# Patient Record
Sex: FEMALE | Race: WHITE
[De-identification: names, ages, dates, MRNs, and addresses within clinical notes are randomized per-mention and may not be internally consistent; named-entity substitution may affect disease eponyms.]

---

## 2017-05-03 ENCOUNTER — HOSPITAL ENCOUNTER (OUTPATIENT)
Dept: HOSPITAL 41 - JD.ED | Age: 52
Setting detail: OBSERVATION
LOS: 1 days | Discharge: HOME | End: 2017-05-04
Payer: COMMERCIAL

## 2017-05-03 DIAGNOSIS — Z90.710: ICD-10-CM

## 2017-05-03 DIAGNOSIS — E87.6: ICD-10-CM

## 2017-05-03 DIAGNOSIS — Z80.0: ICD-10-CM

## 2017-05-03 DIAGNOSIS — Z90.89: ICD-10-CM

## 2017-05-03 DIAGNOSIS — F17.210: ICD-10-CM

## 2017-05-03 DIAGNOSIS — Z90.49: ICD-10-CM

## 2017-05-03 DIAGNOSIS — R22.9: ICD-10-CM

## 2017-05-03 DIAGNOSIS — Z98.51: ICD-10-CM

## 2017-05-03 DIAGNOSIS — Z98.890: ICD-10-CM

## 2017-05-03 DIAGNOSIS — R11.0: ICD-10-CM

## 2017-05-03 DIAGNOSIS — R10.31: Primary | ICD-10-CM

## 2017-05-03 DIAGNOSIS — R59.0: ICD-10-CM

## 2017-05-03 DIAGNOSIS — N20.0: ICD-10-CM

## 2017-05-03 DIAGNOSIS — R19.7: ICD-10-CM

## 2017-05-03 PROCEDURE — 96375 TX/PRO/DX INJ NEW DRUG ADDON: CPT

## 2017-05-03 PROCEDURE — 74177 CT ABD & PELVIS W/CONTRAST: CPT

## 2017-05-03 PROCEDURE — 36415 COLL VENOUS BLD VENIPUNCTURE: CPT

## 2017-05-03 PROCEDURE — 80048 BASIC METABOLIC PNL TOTAL CA: CPT

## 2017-05-03 PROCEDURE — 85025 COMPLETE CBC W/AUTO DIFF WBC: CPT

## 2017-05-03 PROCEDURE — 99285 EMERGENCY DEPT VISIT HI MDM: CPT

## 2017-05-03 PROCEDURE — G0378 HOSPITAL OBSERVATION PER HR: HCPCS

## 2017-05-03 PROCEDURE — 74176 CT ABD & PELVIS W/O CONTRAST: CPT

## 2017-05-03 PROCEDURE — 81001 URINALYSIS AUTO W/SCOPE: CPT

## 2017-05-03 PROCEDURE — 96365 THER/PROPH/DIAG IV INF INIT: CPT

## 2017-05-03 PROCEDURE — 86140 C-REACTIVE PROTEIN: CPT

## 2017-05-03 PROCEDURE — 96376 TX/PRO/DX INJ SAME DRUG ADON: CPT

## 2017-05-03 PROCEDURE — 96361 HYDRATE IV INFUSION ADD-ON: CPT

## 2017-05-03 PROCEDURE — 80053 COMPREHEN METABOLIC PANEL: CPT

## 2017-05-03 PROCEDURE — 83735 ASSAY OF MAGNESIUM: CPT

## 2017-05-03 RX ADMIN — HYDROCODONE BITARTRATE AND ACETAMINOPHEN PRN TAB: 5; 325 TABLET ORAL at 15:58

## 2017-05-03 RX ADMIN — HYDROCODONE BITARTRATE AND ACETAMINOPHEN PRN TAB: 5; 325 TABLET ORAL at 20:04

## 2017-05-03 NOTE — PCM.SN
- Free Text/Narrative


Note: 


CT scan with contrast shows Mesenteric Mass, Several small abnormal surrounding 

lymph nodes, Slight enlarged retro-peritoneal lymph node. Adjacent areas of 

bowel wall thickening within the small bowel. Lymphoma vs Carcinoid Tumor with 

Metastasis. Will defer to Dr. Gillette to this this result with patient.

## 2017-05-03 NOTE — PCM.HP
H&P History of Present Illness





- General


Date of Service: 17


Admit Problem/Dx: 


 Admission Diagnosis/Problem





Admission Diagnosis/Problem      Abdominal pain








Source of Information: Patient, Family, Provider, RN notes reviewed


History Limitations: Reports: No limitations





- History of Present Illness


Initial Comments - Free Text/Narative: 


This a 50 yo white female with no significant past medical hx/o who comes in 

with complaints gradually worsening right lower abdominal pain with radiation 

to the back that started yesterday. Her symptom is associated with nausea w/o 

emesis. She also reports watery diarrhea. She denies any fever or chills. She 

denies any urianry symptoms. She denies any hx/o malignancy. She hx/o endoscopy 

in the past.





However she admits to a strong family hx/o colon cancer with her father and a 

sister. Her initial work up in ED shows a CBC remarkable for WBC of 10.93, and 

Neutrophils or 72.6%. Her chemistry is significant for K of 3.1, CO2 of 20, AG 

of 18.1, BS of 149, and AST of 11. Her UA is negative for UTI. Her Abdominal CT 

scan shows an abnormal mesenteric mass with retro-peritoneal adenopathy. 





Patient is being admitted for Abdominal Pain. Dr. Gillette was consulted in ED 

for further evaluation.





  ** Right Lower Abdomen


Pain Score (Numeric/FACES): 10





- Related Data


Allergies/Adverse Reactions: 


 Allergies











Allergy/AdvReac Type Severity Reaction Status Date / Time


 


No Known Allergies Allergy   Verified 17 03:50











Home Medications: 


 Home Meds





Nicotine [Nicotine Patch] 1 patch TRDERM DAILY 16 [History]


Cyclobenzaprine [Flexeril] 5 mg PO DAILY PRN 17 [History]











Past Medical History


OB/GYN History: Reports: Pregnancy





- Past Surgical History


HEENT Surgical History: Reports: Tonsillectomy


GI Surgical History: Reports: Appendectomy


Female  Surgical History: Reports: Breast biopsy,  section, 

Hysterectomy, Tubal ligation


Dermatological Surgical History: Reports: Other (see below)





Social & Family History





- Tobacco Use


Smoking Status *Q: Current Some Day Smoker


Years of Tobacco use: 30


Packs/Tins Daily: 0.5


Used Tobacco, but Quit: Yes


Month Tobacco Last Used: 


Second Hand Smoke Exposure: No





- Caffeine Use


Caffeine Use: Reports: None





- Recreational Drug Use


Recreational Drug Use: No


Drug Use in Last 12 Months: No





H&P Review of Systems





- Review of Systems:


Review Of Systems: See Below


General: Denies: fever, chills


HEENT: Reports: no symptoms


Pulmonary: Denies: Shortness of Breath


Cardiovascular: Denies: chest pain


Gastrointestinal: Reports: Abdominal pain, Diarrhea, Nausea.  Denies: Decreased 

appetite, Difficulty swallowing, Hematemesis, Hematochezia, Vomiting


Genitourinary: Reports: no symptoms


Musculoskeletal: Reports: no symptoms


Skin: Denies: cyanosis, pruritis, rash, erythema


Psychiatric: Denies: depression, anxiety, agitation, hallucinations


Neurological: Denies: Seizure, Difficulty Walking, Weakness, Gait Disturbance


Hematologic/Lymphatic: Reports: no symptoms


Immunologic: Reports: no symptoms





Exam





- Exam


Exam: See Below





- Vital Signs


Vital Signs: 


 Last Vital Signs











Temp  36.2 C   17 03:45


 


Pulse  109 H  17 03:45


 


Resp  20   17 03:45


 


BP  156/79 H  17 03:45


 


Pulse Ox  100   17 03:45











Weight: 98.43 kg





- Exam


General: alert, oriented, cooperative, mild distress


HEENT: Conjunctiva clear, EACs clear, EOMI, Hearing intact, Mucosa moist & pink

, Nares patent, Normal nasal septum, Posterior pharynx clear, Pupils equal, 

Pupils reactive


Neck: supple, trachea midline


Lungs: Clear to auscultation, Normal respiratory effort


Cardiovascular: regular rate, regular rhythm


Abdomen: normal bowel sounds, soft, organomegaly, tenderness (right lower 

abdomen).  No: distention, guarding, rigidity, rebound


 (Female) Exam: Deferred


Rectal (Female) Exam: Deferred


Back Exam: normal inspection, decreased range of motion


Extremities: normal inspection, normal pulses


Peripheral Pulses: 2+: posterior tibial (L), posterior tibial (R), dorsalis 

pedis (L), dorsalis pedis (R)


Skin: warm, dry, intact


Neuro Extensive - Mental Status: oriented x3, normal cognition, memory intact


Neuro Extensive - Motor, Sensory, Reflexes: CN II-XII intact, normal gait


Psychiatric: alert, normal affect, normal mood





- Patient Data


Result Diagrams: 


 17 04:40





 17 04:40





*Q Meaningful Use (ADM)





- VTE *Q


VTE Criteria *Q: 








- Stroke *Q


Stroke Criteria *Q: 








- AMI *Q


AMI Criteria *Q: 





Problem List Initiated/Reviewed/Updated: Yes


Orders Last 24hrs: 


 Active Orders 24 hr











 Category Date Time Status


 


 Sodium Chloride 0.9% [Normal Saline] 1,000 ml Med  17 07:45 Active





 IV ASDIRECTED   








 Medication Orders





Sodium Chloride (Normal Saline)  1,000 mls @ 150 mls/hr IV ASDIRECTED [facility]ATH[facility]


   Last Admin: 17 04:35  Dose: 150 mls/hr


Potassium Chloride 10 meq/ (Premix)  100 mls @ 50 mls/hr IV ASDIRECTED ONE


   Stop: 17 09:27


   Last Admin: 17 07:37  Dose: 50 mls/hr


Sodium Chloride (Normal Saline)  1,000 mls @ 150 mls/hr IV ASDIRECTED KATHERINE


Sodium Chloride (Saline Flush)  10 ml FLUSH ASDIRECTED PRN


   PRN Reason: Keep Vein Open


   Last Admin: 17 04:37  Dose: 10 ml








Assessment/Plan Comment:: 


Assessment/Plan:


Acute:


Right Lower Abdominal Pain


   - Abdominal CT scan: 3 cm small mesenteric soft tissue mass. Retro-

peritoneal adenopathy


   - Strong family hx/o Colon CA : Dad and Sister (both )


   - Suspect Lymphoma/Intra-abdominal Tumor/Carcinoid Tumor


   - Awaiting further input from Dr. Gillette


   - Consider CT scan with Contrast


   - PRN pain meds for now 





Nausea w/o Vomiting


   - PRN Anti-emesis


   - Supportive Care





Watery Diarrhea


   - No unusual drink or diet


   - No recent travel


   - No recent antibiotic use


   - Cannot r/o stomach flu


   - Supportive Clear





Hypokalemia


   - K 3.1 likely from GI Loss


   - Pharmacy to replete and monitor





Non-obstructing Stone in the Right Kidney





Chronic:


Nicotine Dependence/Active Smoker


Muscle Spasm





Plan:


Admit to Obs


Routine AM Labs


Offered Nicotine Patch-refused


PRN Meds for Symptomatic Control


Consult Dr. Gillette


NPO for now until GS eval


SW/CM for d/c planning


CT scan with contrast


Code status:1

## 2017-05-04 VITALS — DIASTOLIC BLOOD PRESSURE: 54 MMHG | SYSTOLIC BLOOD PRESSURE: 132 MMHG

## 2017-05-04 RX ADMIN — HYDROCODONE BITARTRATE AND ACETAMINOPHEN PRN TAB: 5; 325 TABLET ORAL at 12:15

## 2017-05-04 RX ADMIN — HYDROCODONE BITARTRATE AND ACETAMINOPHEN PRN TAB: 5; 325 TABLET ORAL at 00:30

## 2017-05-04 RX ADMIN — HYDROCODONE BITARTRATE AND ACETAMINOPHEN PRN TAB: 5; 325 TABLET ORAL at 06:20

## 2017-05-04 NOTE — CONS
CONSULTING PHYSICIAN:  Bronson Gillette MD

DATE OF CONSULTATION:  05/03/2017

 

HISTORY:

This is a 51-year-old who has abdominal pain, right lower quadrant, radiating to

the right flank.  This started yesterday morning and continued to be quite

severe.  This was associated with nausea and no vomiting.  She came into the

emergency room and was given Dilaudid for pain.  CT scan was done and a stone

pathology considering that she might have renal stones.  This demonstrated a

small mesenteric soft tissue measuring 3 cm, surrounding mesenteric lymph nodes

seen as well as minimal retroperitoneal adenopathy.  Oral contrast was not

given.  The patient states that, about a week ago, she had similar nagging pain

there.  She was seen in the walk-in clinic at [place], but nothing specific was

established.  The patient now is comfortable and not having any particular

problem.

 

PAST MEDICAL HISTORY:

The patient's past medical history has been that of good health.  She has had a

tonsillectomy and reports in the history of appendectomy, breasts biopsy, C-

section, hysterectomy, and tubal ligation.

 

SOCIAL HISTORY:

She is an everyday smoker.  Does not use alcohol.

 

FAMILY HISTORY:

Prostate cancer in her family.

 

ALLERGIES:

None known.

 

MEDICATIONS:

None.

 

REVIEW OF SYSTEMS:

Has some nausea.  No vomiting.  Does have diarrhea alternating with

constipation, and no change.  No chest pain, shortness of breath, cough,

hoarseness, wheezing, fainting, weakness, numbness, convulsions, swelling, or

swelling of the legs.

 

PHYSICAL EXAMINATION:

GENERAL:  Reveals a cooperative female.

EYES:  Sclerae white.  Extraocular muscle motion normal.

ORAL CAVITY:  Healthy mucous membrane with mouth and tongue.

NECK:  Supple.  No nodes.  No thyromegaly.  Trachea midline.

LUNGS:  Clear.  No rales, rhonchi, or dullness.

HEART:  Tones regular rate.  No S3, S4, jugular venous distention or murmurs.

ABDOMEN:  Soft.  No tenderness, guarding, rebound, organomegaly, or pulsatile

masses.

EXTREMITIES:  Upper and lower extremities, no angulation deformities.  No

cervical or inguinal lymph nodes.  Moves all 4 extremities.

NEUROLOGIC:  3 through 12 intact.  No sensorineural deficit.

SKIN:  No symptoms.

 

ASSESSMENT:

Abdominal pain with abnormal CT scan.  Abdominal pain has resolved.  We will

review the x-ray with radiologist.  Consider possibly repeating the CT scan with

contrast.

 

DD:  05/03/2017 13:14:14

DT:  05/03/2017 14:35:19  MMODAL

Job #:  [id number]

## 2017-05-04 NOTE — PCM.PN
- General Info


Date of Service: 17


Functional Status: Reports: pain controlled, tolerating diet, ambulating, 

urinating





- Review of Systems


General: Reports: Weakness


HEENT: Reports: no symptoms


Pulmonary: Reports: shortness of breath


Cardiovascular: Reports: No Symptoms


Gastrointestinal: Reports: Abdominal pain


Genitourinary: Reports: no symptoms


Musculoskeletal: Reports: no symptoms


Skin: Reports: no symptoms


Neurological: Reports: No Symptoms


Psychiatric: Reports: no symptoms





- Patient Data


Vitals - most recent: 


 Last Vital Signs











Temp  36.2 C   17 05:56


 


Pulse  68   17 05:56


 


Resp  17   17 05:56


 


BP  136/60   17 05:56


 


Pulse Ox  92 L  17 05:56











Weight - most recent: 98.928 kg


Lab Results last 24 hrs: 


 Laboratory Results - last 24 hr











  17 Range/Units





  06:04 06:04 


 


WBC  9.62   (3.98-10.04)  K/mm3


 


RBC  4.45   (3.98-5.22)  M/mm3


 


Hgb  12.7   (11.2-15.7)  gm/L


 


Hct  38.2   (34.1-44.9)  %


 


MCV  85.8   (79.4-94.8)  fl


 


MCH  28.5   (25.6-32.2)  pg


 


MCHC  33.2   (32.2-35.5)  g/dl


 


RDW Std Deviation  45.0   (36.4-46.3)  fL


 


Plt Count  198   (182-369)  K/mm3


 


MPV  10.1   (9.4-12.3)  fl


 


Neut % (Auto)  59.1   (34.0-71.1)  %


 


Lymph % (Auto)  33.1   (19.3-51.7)  %


 


Mono % (Auto)  6.1   (4.7-12.5)  %


 


Eos % (Auto)  1.1   (0.7-5.8)  


 


Baso % (Auto)  0.5   (0.1-1.2)  %


 


Neut # (Auto)  5.68   (1.56-6.13)  K/mm3


 


Lymph # (Auto)  3.18   (1.18-3.74)  K/mm3


 


Mono # (Auto)  0.59 H   (0.24-0.36)  K/mm3


 


Eos # (Auto)  0.11   (0.04-0.36)  K/mm3


 


Baso # (Auto)  0.05   (0.01-0.08)  K/mm3


 


Sodium   143  (136-145)  mEq/L


 


Potassium   3.5  (3.5-5.1)  mEq/L


 


Chloride   110 H  ()  mEq/L


 


Carbon Dioxide   23  (21-32)  mEq/L


 


Anion Gap   13.5  (5-15)  


 


BUN   4 L  (7-18)  mg/dL


 


Creatinine   0.7  (0.55-1.02)  mg/dL


 


Est Cr Clr Drug Dosing   85.56  mL/min


 


Estimated GFR (MDRD)   > 60  (>60)  mL/min


 


BUN/Creatinine Ratio   5.7 L  (14-18)  


 


Glucose   93  ()  mg/dL


 


Calcium   7.7 L  (8.5-10.1)  mg/dL


 


Magnesium   1.9  (1.8-2.4)  mg/dl











Med Orders - Current: 


 Current Medications





Acetaminophen (Tylenol)  650 mg PO Q4H PRN


   PRN Reason: Pain (Mild 1-3)/fever


Hydrocodone Bitart/Acetaminophen (Norco 325-5 Mg)  1 tab PO Q4H PRN


   PRN Reason: Pain (moderate 4-6)


   Last Admin: 17 12:15 Dose:  1 tab


Albuterol/Ipratropium (Duoneb 3.0-0.5 Mg/3 Ml)  3 ml NEB Q4H PRN


   PRN Reason: Shortness Of Breath/wheezing


Bisacodyl (Dulcolax)  5 mg PO DAILY PRN


   PRN Reason: Constipation


Cyclobenzaprine HCl (Flexeril)  5 mg PO DAILY PRN


   PRN Reason: Pain


Hydromorphone HCl (Dilaudid)  1 mg IVPUSH Q4H PRN


   PRN Reason: Pain


   Last Admin: 17 09:06 Dose:  1 mg


Sodium Chloride (Normal Saline)  1,000 mls @ 150 mls/hr IV ASDIRECTED [facility]


   Last Admin: 17 08:14 Dose:  150 mls/hr


Lorazepam (Ativan)  1 mg IV Q6H PRN


   PRN Reason: Nausea/Vomiting


Miscellaneous Information (Remove Patch)  0 ea TRDERM DAILY [facility]


   Last Admin: 17 08:14 Dose:  Not Given


Nicotine (Habitrol)  7 mg TRDERM DAILY [facility]


   Last Admin: 17 08:14 Dose:  Not Given


Ondansetron HCl (Zofran)  4 mg IV Q6H PRN


   PRN Reason: Nausea/Vomiting


   Last Admin: 17 14:20 Dose:  4 mg


Polyethylene Glycol (Miralax)  17 gm PO DAILY PRN


   PRN Reason: Constipation


Senna/Docusate Sodium (Senna Plus)  1 tab PO BID PRN


   PRN Reason: Constipation


Sodium Chloride (Saline Flush)  10 ml FLUSH ONETIME PRN


   PRN Reason: IV FLUSH


   Last Admin: 17 14:31 Dose:  10 ml


Temazepam (Restoril)  30 mg PO BEDTIME PRN


   PRN Reason: Sleep





Discontinued Medications





Diatrizoate Meglum/Diatrizoate Sod (Gastrografin 37%)  90 ml PO ONETIME ONE


   Stop: 17 14:03


   Last Admin: 17 14:31 Dose:  90 ml


Hydromorphone HCl (Dilaudid)  0.5 mg IVPUSH ONETIME ONE


   Stop: 17 04:08


   Last Admin: 17 04:37 Dose:  0.5 mg


Hydromorphone HCl (Dilaudid)  0.5 mg IVPUSH ONETIME ONE


   Stop: 17 06:14


   Last Admin: 17 06:19 Dose:  0.5 mg


Hydromorphone HCl (Dilaudid)  0.5 mg IVPUSH ONETIME ONE


   Stop: 17 07:41


   Last Admin: 17 07:44 Dose:  0.5 mg


Sodium Chloride (Normal Saline)  500 mls @ 999 mls/hr IV .BOLUS ONE


   Stop: 17 06:14


   Last Admin: 17 06:45 Dose:  999 mls/hr


Sodium Chloride (Normal Saline)  500 mls @ 999 mls/hr IV .BOLUS ONE


   Stop: 17 06:27


   Last Admin: 17 07:56 Dose:  Not Given


Potassium Chloride 10 meq/ (Premix)  100 mls @ 50 mls/hr IV ASDIRECTED ONE


   Stop: 17 09:27


   Last Admin: 17 07:37 Dose:  50 mls/hr


Sodium Chloride (Normal Saline)  1,000 mls @ 150 mls/hr IV ASDIRECTED [facility]


Promethazine HCl 12.5 mg/ (Sodium Chloride)  50.5 mls @ 100 mls/hr IV Q6H PRN


   PRN Reason: Nausea/Vomiting


Iopamidol (Isovue-300 (61%))  150 ml IVPUSH ONETIME ONE


   Stop: 17 14:03


   Last Admin: 17 14:31 Dose:  110 ml


Ondansetron HCl (Zofran)  4 mg IVPUSH ONETIME ONE


   Stop: 17 04:08


   Last Admin: 17 04:35 Dose:  4 mg


Sodium Chloride (Saline Flush)  10 ml FLUSH ASDIRECTED PRN


   PRN Reason: Keep Vein Open


   Last Admin: 17 04:37 Dose:  10 ml











- Exam


Quality Assessment: DVT prophylaxis


General: alert, oriented, cooperative, no acute distress


HEENT: Pupils equal, Pupils reactive


Neck: supple, trachea midline


Lungs: Normal respiratory effort


Cardiovascular: Regular Rate


Abdomen: bowel sounds present, soft, no tenderness, no distension, tenderness (

RLQ)


 (Female) Exam: Deferred


Back Exam: normal inspection


Extremities: normal pulses


Skin: warm


Neurological: no new focal deficit, normal gait, normal speech


Psy/Mental Status: alert, normal affect, normal mood





- Problem List Review


Problem List Initiated/Reviewed/Updated: Yes





- Plan


Plan:: 


Assessment/Plan:


Acute:


Right Lower Abdominal Pain


   - Abdominal CT scan: 3 cm small mesenteric soft tissue mass. Retro-

peritoneal adenopathy


   - Strong family hx/o Colon CA : Dad and Sister (both )


   - Suspect Lymphoma/Intra-abdominal Tumor/Carcinoid Tumor


   - Awaiting further input from Dr. Gillette


   - Consider CT scan with Contrast


   - PRN pain meds for now 





Nausea w/o Vomiting


   - PRN Anti-emesis


   - Supportive Care





Watery Diarrhea


   - No unusual drink or diet


   - No recent travel


   - No recent antibiotic use


   - Cannot r/o stomach flu


   - Supportive Clear





Hypokalemia


   - K 3.1 likely from GI Loss


   - Pharmacy to replete and monitor





Non-obstructing Stone in the Right Kidney











Chronic:


Nicotine Dependence/Active Smoker


Muscle Spasm











Plan:


DC per gen surg with scheduled evaluation in [place].


Routine AM Labs


Offered Nicotine Patch-refused


PRN Meds for Symptomatic Control


Consult Dr. Gillette, will discharge patient


SW/CM for d/c planning


CT scan with contrast


Code status:1

## 2017-05-20 NOTE — DISCH
ADMISSION DATE:  05/03/2017

DISCHARGE DATE:  05/04/2017

 

HISTORY OF PRESENT ILLNESS:

This is a 51-year-old female who was admitted by the hospitalist on 05/03/2017.

She had pain in the right lower quadrant radiating to the right flank.  It

started yesterday, continued to be quite severe, associated with nausea, no

vomiting.  She came into the emergency room and was given Dilaudid.  CT scan was

done using a stone protocol, thinking that she may have a renal stone.  This

demonstrated a small mesenteric soft tissue mass 3 cm surrounding the mesenteric

lymph node and some minimal retroperitoneal adenopathy.  Oral contrast was not

given.  Surgical consultation was sent to me, and I recommended oral contrast

and IV contrast.  This demonstrated pathology mesenteric mass and enlarged

retroperitoneal lymph nodes, adjacent area of bowel wall thickening representing

possibly a lymphoma.  Other etiology could be carcinoid tumor.  A biopsy was

needed.  Other medical problems were handled by the hospitalist, and his

medications were handled by the hospitalist.  Arrangements were made for the

patient to undergo needle-aspirate biopsy, and he was discharged.

 

PHYSICAL EXAMINATION:

GENERAL:  Examination at the time of admission revealed an alert, cooperative

female.

EYES, EARS, NOSE, AND THROAT:  Unremarkable.

NECK:  Supple.

LUNGS:  Clear.

HEART:  Tones normal.

ABDOMEN:  There is no tenderness, guarding, or rebound in the abdomen.

EXTREMITIES:  Unremarkable.

 

HOSPITAL COURSE:

After the patient was seen in the hospital, a repeat CT scan was done with oral

and IV contrast showing adenopathy consistent with possible lymphoma.

Arrangements were made for her to go to [place] for needle-aspirate biopsy.

 

DISCHARGE DIAGNOSES:

1. Mesenteric soft tissue mass with retroperitoneal adenopathy, suspect

    lymphoma.

2. Nausea and vomiting, treated with antiemetic drugs.

3. Some watery diarrhea which was worked up, and no specific pathology found.

4. Hypokalemia, which was corrected.

 

CONDITION ON DISCHARGE:

Improved.

 

DISCHARGE DIET:

Regular.

 

DISCHARGE MEDICATIONS:

Per medication reconciliation form.

 

DISCHARGE WORK:

No work.

 

DISPOSITION:

As per biopsy report in [place].

 

FINAL DIAGNOSIS:

 

 

DIET:

 

 

ACTIVITY:

 

 

FOLLOW-UP:

 

 

DD:  05/19/2017 15:31:14

DT:  05/20/2017 14:40:26  MMODAL

Job #:  [id number]

## 2022-02-11 NOTE — CT
CT abdomen and pelvis

 

Technique: Multiple axial sections were obtained from above the dome 

of the diaphragm inferiorly through the pubic symphysis.  Intravenous 

and oral contrast has not been given.  Study was performed as a 

ureteral stone protocol.

 

Comparison: Previous CT abdomen and pelvis exam of 01/13/10.

 

Findings: Kidneys show no hydronephrosis.  Very minimal nonobstructing

 stone seen within the upper right kidney.  No other abnormal 

calcifications are seen along the course of the ureters or within the 

kidneys.

 

Visualized lung bases show nothing acute.  Small calcification noted 

within the liver believed to represent incidental granuloma.  No other

 focal parenchymal abnormality is seen within the liver.  Spleen 

appears within normal limits.  Adrenal glands show no nodule.  

Pancreas shows no discrete abnormality.  Gallbladder shows no 

calcified gallstones.  Aorta contains atherosclerotic calcification 

without aneurysmal dilatation.  Several slightly prominent 

retroperitoneal lymph nodes are seen.  Possible soft tissue mass at 

the root of the mesentery is seen measuring around 3.0 cm.  There are 

some enlarged surrounding lymph nodes seen within the mesentery.  No 

pelvic mass or adenopathy is seen.  No free fluid is seen.  No 

findings of appendicitis.

 

Bone window settings were reviewed which show sclerosis within the 

left sacroiliac joint which is felt to be degenerative.  Scattered 

degenerative endplate spurring is seen within the spine.

 

Impression:

1.  Small mesenteric soft tissue mass measuring 3.0 cm.  Small 

surrounding mesentery lymph nodes are seen as well as minimal 

retroperitoneal adenopathy.  These findings are an interval change 

from previous CT exam.  Detail is somewhat limited without oral 

contrast.  Recommend these findings be confirmed after oral and IV 

contrast.

2.  Minimal nonobstructing stone within the right kidney.  No 

hydronephrosis or ureteral stone is seen.

3.  Other incidental findings as described above.

 

Diagnostic code #9

 

I agree with preliminary report issued by [facility] (report finalized on 

05/03/17, 6:25 AM Central Time)
CT abdomen and pelvis

 

Technique: Multiple axial sections were obtained from above the dome 

of the diaphragm inferiorly through the pubic symphysis.  Intravenous 

and oral contrast was utilized.

 

Comparison: Previous CT study performed earlier on the same day (time 

04:48 AM).

 

Findings: Mesenteric mass is confirmed on current exam.  Slight 

spiculation is seen into the mesentery.  Mass measures 2.5 cm in AP 

dimension and 2.8 cm in transverse dimension.  There is a portion of 

superior mesenteric artery that extends through this mass.  Small 

surrounding lymph nodes are seen which are felt to be pathologic.  

Enlarged left retroperitoneal lymph node is seen measuring 1.6 cm in 

size.  Anterior to the mesenteric mass there are several loops of 

small bowel showing wall thickening.

 

Visualized lung bases shows nothing acute.  Fatty infiltration noted 

within the liver.  No focal abnormality is appreciated within the 

liver.  Spleen appears within normal limits.  Adrenal glands show no 

nodule.  Kidneys show contrast enhancement.  No hydronephrosis or mass

 is seen.  Aorta shows atherosclerotic plaque.  Mid aorta is mildly 

ectatic at 2.3 cm.  Pancreas appears to be within normal limits.  No 

pelvic mass or adenopathy is seen.  Delayed images show contrast 

within the bladder.

 

Bone window settings were reviewed which show scattered degenerative 

change within the spine.

 

Impression:

1.  Mesenteric mass is confirmed on current study showing slight 

spiculation into the mesentery.  Several small abnormal surrounding 

lymph nodes are seen.  Slightly enlarged retroperitoneal lymph node 

seen in the same region.  Adjacent areas of bowel wall thickening seen

 within small bowel.  Findings may represent lymphoma.  Other etiology

 could be carcinoid tumor with metastasis, although this is felt less 

likely than first etiology.  Biopsy will be needed to differentiate.

2.  Other incidental findings as noted above.

 

Diagnostic code #9

 

 called and talked to Dr. Gillette on 05/03/17 at 5100
No change

## 2023-05-10 ENCOUNTER — HOSPITAL ENCOUNTER (OUTPATIENT)
Dept: HOSPITAL 41 - JD.SDS | Age: 58
Discharge: HOME | End: 2023-05-10
Attending: SURGERY
Payer: COMMERCIAL

## 2023-05-10 VITALS — SYSTOLIC BLOOD PRESSURE: 142 MMHG | HEART RATE: 70 BPM | DIASTOLIC BLOOD PRESSURE: 78 MMHG

## 2023-05-10 DIAGNOSIS — Z79.899: ICD-10-CM

## 2023-05-10 DIAGNOSIS — Z91.040: ICD-10-CM

## 2023-05-10 DIAGNOSIS — I10: ICD-10-CM

## 2023-05-10 DIAGNOSIS — Z80.0: ICD-10-CM

## 2023-05-10 DIAGNOSIS — Z12.11: Primary | ICD-10-CM

## 2023-05-10 DIAGNOSIS — Z87.891: ICD-10-CM

## 2023-05-10 DIAGNOSIS — K57.30: ICD-10-CM

## 2023-05-10 DIAGNOSIS — E87.6: ICD-10-CM

## 2023-05-10 DIAGNOSIS — Z90.49: ICD-10-CM

## 2023-05-10 DIAGNOSIS — K62.89: ICD-10-CM

## 2023-05-10 PROCEDURE — 45380 COLONOSCOPY AND BIOPSY: CPT
